# Patient Record
Sex: FEMALE | Race: WHITE | NOT HISPANIC OR LATINO | URBAN - METROPOLITAN AREA
[De-identification: names, ages, dates, MRNs, and addresses within clinical notes are randomized per-mention and may not be internally consistent; named-entity substitution may affect disease eponyms.]

---

## 2021-04-21 ENCOUNTER — OFFICE VISIT - HEALTHEAST (OUTPATIENT)
Dept: MIDWIFE SERVICES | Facility: CLINIC | Age: 26
End: 2021-04-21

## 2021-04-21 DIAGNOSIS — Z01.419 WELL WOMAN EXAM: ICD-10-CM

## 2021-04-21 DIAGNOSIS — Z86.19 H/O TRAVELER'S DIARRHEA: ICD-10-CM

## 2021-04-21 DIAGNOSIS — Z30.09 COUNSELING FOR INITIATION OF BIRTH CONTROL METHOD: ICD-10-CM

## 2021-04-21 ASSESSMENT — MIFFLIN-ST. JEOR: SCORE: 1574.35

## 2021-04-22 RX ORDER — NORGESTIMATE AND ETHINYL ESTRADIOL 7DAYSX3 LO
1 KIT ORAL DAILY
Qty: 84 TABLET | Refills: 3 | Status: SHIPPED | OUTPATIENT
Start: 2021-04-22

## 2021-06-05 VITALS
SYSTOLIC BLOOD PRESSURE: 102 MMHG | HEART RATE: 56 BPM | BODY MASS INDEX: 21.81 KG/M2 | DIASTOLIC BLOOD PRESSURE: 70 MMHG | HEIGHT: 72 IN | WEIGHT: 161 LBS

## 2021-06-16 NOTE — PROGRESS NOTES
"Annual Health Maintenance Exam    Subjective:     Genie is a 25 y.o. female who presents for an annual exam.  She is a new patient to the [facility]. She is getting  in August and would like to have a well woman exam and initiate birth control prior to wedding.  Genie reports having a healthy year, she did contract COVID in November, but has no residual symptoms. She works full time as a Crossfit instructor.  She is interested in birth control that does not disrupt a fertilized egg. Discussed all MOA of birth control options including OCP, patch, ring, IUDs, injectables.  After discussion, she is requesting OCPs.  Denies history of any clotting disorders, family history negative for clotting disorders.    Lastly she is requesting rx for \"travelers diarrhea\" prior to her honeymoon trip to [place].          Allergies  Shellfish containing products and Tuberculin ppd    Current Medications  No current outpatient medications on file.     No current facility-administered medications for this visit.        Past Medical/Surgical History  Past Medical History:   Diagnosis Date     Anorexia nervosa in remission     while in Middle school 6th-8th grades, remission now     Varicella      No past surgical history on file.    Obstetric and Gynecologic History  Patient's last menstrual period was 2021 (within days).    OB History    Para Term  AB Living   0 0 0 0 0 0   SAB TAB Ectopic Multiple Live Births   0 0 0 0 0       Last Pap: Never.  Last mammogram: Never    Immunization History  Status updated.    Family History  Family History   Problem Relation Age of Onset     Hyperlipidemia Father      Hyperlipidemia Brother      Stroke Maternal Grandfather      Colon cancer Paternal Grandmother        Health Maintenance  Diet:  general  Regular Exercise: Yes.  Crossfit/.    Caffeine use:  regular  Sunscreen Use: Yes.   Dental Visits Regularly: Yes  Seat Belt: Yes  Self " "Breast Exam Monthly:Yes  Abuse History:  Does not have a history of abuse.  Currently does feel safe in all her relationships.      Social History  Social History     Socioeconomic History     Marital status: Single     Spouse name: Not on file     Number of children: Not on file     Years of education: Not on file     Highest education level: Not on file   Occupational History     Not on file   Social Needs     Financial resource strain: Not on file     Food insecurity     Worry: Not on file     Inability: Not on file     Transportation needs     Medical: Not on file     Non-medical: Not on file   Tobacco Use     Smoking status: Never Smoker     Smokeless tobacco: Never Used   Substance and Sexual Activity     Alcohol use: Not on file     Drug use: Never     Sexual activity: Never     Partners: Male   Lifestyle     Physical activity     Days per week: Not on file     Minutes per session: Not on file     Stress: Not on file   Relationships     Social connections     Talks on phone: Not on file     Gets together: Not on file     Attends Yarsanism service: Not on file     Active member of club or organization: Not on file     Attends meetings of clubs or organizations: Not on file     Relationship status: Not on file     Intimate partner violence     Fear of current or ex partner: Not on file     Emotionally abused: Not on file     Physically abused: Not on file     Forced sexual activity: Not on file   Other Topics Concern     Not on file   Social History Narrative     Not on file       Further Screening History  Colonoscopy: NA.  Lipid Profile: NA.  Glucose Screen: NA.  Last Dexa: NA.    Review of Systems  A 12 point comprehensive review of systems was negative except as noted.    Objective:         Vitals:    04/21/21 0953   BP: 102/70   Pulse: (!) 56   Weight: 161 lb (73 kg)   Height: 5' 11.5\" (1.816 m)       Physical Exam:  General: Pleasant, articulate, well-groomed, well-nourished female.  Not in any apparent " distress.  Neck: Supple.  Thyroid: Small, symmetrical, no nodules noted.  Lymphadenopathy: Negative.  Lungs: Clear to auscultation bilaterally, and a nonlabored breathing pattern.  Cardio: Regular rate and rhythm, negative for murmur.   Breasts: Symmetrical, nontender, no masses, negative lymphadenopathy, negative nipple discharge.  Abdomen: Soft, nontender, no masses, negative CVAT.  External genitalia: Normal hair distribution, no lesions.  Urethral opening: Without lesions, or tenderness.   Bladder: Without masses, or tenderness.  Vagina: Pink, rugated, normal-appearing discharge.  Cervix: very posterior, pink, smooth, nulliparous, no lesions.  Pap smear was obtained.  Bimanual: Finds uterus small anteverted, mobile, nontender, no masses.  Negative CMT.  Adnexa: without masses or tenderness.    Lower extremities: +1 reflexes, no significant edema.      Assessment / Plan:     1) Annual health maintenance exam generally within normal limits today.  Pap smear collected today.  2) Initiation of OCP's  3) International travel planned    1. Discussed nutrition and exercise.  Advised MVI, Vitamin D3 4000IU geltab and an omega 3 supplement daily.  Accepts the following HM labs: none today.   Breast self exam technique reviewed and patient encouraged to perform self-exam monthly. Contraception: abstinence.  Next pap due 2024.   2. Discussed birth control options at length.  Decision made to initiate OCP's and handout for use instructions given.  Discussed importance of BP check after 3-6 months of OCP use.  She will schedule f/u appt for BP check after she initiates OCP (likely July). ACHES danger s/sx reviewed.    3. Rx for travelers diarrhea provided, azythromycin 500 mg every day x 3 days.  Refill x 1.    4.  RTC in 3-6 months for BP check and in 1 year for annual physical exam, or PRN.    50 minutes on the date of the encounter doing chart review, patient visit and documentation   15 minutes spent on birth control  education, counseling and prescribing

## 2021-06-27 ENCOUNTER — HEALTH MAINTENANCE LETTER (OUTPATIENT)
Age: 26
End: 2021-06-27

## 2021-10-17 ENCOUNTER — HEALTH MAINTENANCE LETTER (OUTPATIENT)
Age: 26
End: 2021-10-17

## 2022-05-29 ENCOUNTER — HEALTH MAINTENANCE LETTER (OUTPATIENT)
Age: 27
End: 2022-05-29

## 2022-10-03 ENCOUNTER — HEALTH MAINTENANCE LETTER (OUTPATIENT)
Age: 27
End: 2022-10-03

## 2023-06-04 ENCOUNTER — HEALTH MAINTENANCE LETTER (OUTPATIENT)
Age: 28
End: 2023-06-04